# Patient Record
(demographics unavailable — no encounter records)

---

## 2024-11-13 NOTE — PHYSICAL EXAM
[Respiratory Effort] : normal respiratory effort [Normal Rate and Rhythm] : normal rate and rhythm [No Rash or Lesion] : No rash or lesion [Calm] : calm [de-identified] : NAD, well-appearing  [de-identified] : soft, nondistended, nontender [de-identified] : no pain or mass on rectal exam; about 3-4cm lateral to anal canal, there is a fluctuant mass ~ 3-4 cm without overlying drainage

## 2024-11-13 NOTE — ASSESSMENT
[FreeTextEntry1] : 38M with perianal abscess.   Will performed incision and drainage today We discussed possible need for imaging in the future to assess for fistula formation, especially if recurs.  Patient in agreement to proceed.  After informed consent was signed by the patient and witnessed by the medical assistant, the area was prepped with Betadine and 8cc of 1% lidocaine with epinephrine were used to anesthetize the skin. An 11 blade was then used to incise the abscess over the most fluctuant area. Copious purulent fluid drained. This was cultured.  Cavity was probed to ensure complete drainage. The area was irrigated and packed with 1/4 inch iodoform packing strrip and covered with gauze. The patient tolerated the procedure well.  Wound care instructions provided. Follow up 1 week

## 2024-11-13 NOTE — HISTORY OF PRESENT ILLNESS
[de-identified] : 37 yo M with h/o pilonidal cyst s/p excision and ?sebaceous cysts s/p excision in the past now with new left buttock abscess. He noticed it about 5 days ago and saw a dermatologist on Monday who referred him to surgery. He was started on cefdinir by that physician. He reports mildly enlarged abscess since and significant tenderness and pain in the area. Feels some pressure in anal canal but no pain and is able to move bowels. Has been trying sitz bath. Never had this before in this area. No fevers or chills.

## 2024-11-20 NOTE — ASSESSMENT
[FreeTextEntry1] : 37 yo M with perianal abscess, first episode.  Continue sitz bath x 1 week cover for a few more days until drainage stops follow up if recurs. If recurs, will need workup to assess for fistula

## 2024-11-20 NOTE — HISTORY OF PRESENT ILLNESS
[de-identified] : Patient returns s/p drainage of buttock abscess. Doing well with minimal residual inflammation, minimal drainage and nearly no pain.

## 2024-11-20 NOTE — PHYSICAL EXAM
[Respiratory Effort] : normal respiratory effort [Normal Rate and Rhythm] : normal rate and rhythm [Calm] : calm [de-identified] : NAD, well-appearing  [de-identified] : anicteric [de-identified] : soft, nondistended  [de-identified] : healing i&d site with minimal induration, no erythema, scant drainage and nearly healed incision